# Patient Record
Sex: FEMALE | Race: WHITE | Employment: STUDENT | URBAN - NONMETROPOLITAN AREA
[De-identification: names, ages, dates, MRNs, and addresses within clinical notes are randomized per-mention and may not be internally consistent; named-entity substitution may affect disease eponyms.]

---

## 2017-05-04 ENCOUNTER — HOSPITAL ENCOUNTER (OUTPATIENT)
Dept: OTHER | Age: 22
Discharge: HOME | End: 2017-05-04
Attending: CLINIC/CENTER | Admitting: CLINIC/CENTER

## 2017-05-07 LAB
CULTURE: NORMAL
ORGANISM: NORMAL
REPORT STATUS: NORMAL
REQUEST PROBLEM: NORMAL
SPECIMEN: NORMAL
TOTAL COLONY COUNT: NORMAL

## 2017-11-21 ENCOUNTER — OFFICE VISIT (OUTPATIENT)
Dept: ORTHOPEDIC SURGERY | Age: 22
End: 2017-11-21

## 2017-11-21 DIAGNOSIS — S63.259A FINGER DISLOCATION, INITIAL ENCOUNTER: Primary | ICD-10-CM

## 2017-11-21 DIAGNOSIS — R52 PAIN: ICD-10-CM

## 2017-11-21 PROCEDURE — 73130 X-RAY EXAM OF HAND: CPT | Performed by: PHYSICIAN ASSISTANT

## 2017-11-21 PROCEDURE — 99203 OFFICE O/P NEW LOW 30 MIN: CPT | Performed by: PHYSICIAN ASSISTANT

## 2017-11-21 NOTE — PROGRESS NOTES
Subjective:      Patient ID: Neela Mancilla is a 20 y.o. female.     HPI    Review of Systems    Objective:   Physical Exam    Assessment:      ***      Plan:      ***
stated age. There is no gross instability. There are no rashes, ulcerations or lesions. Strength and tone are normal.      MEDICAL DATA:   Outside record review:  none    Imaging:  Hand x-ray:  3 view(s) of the right hand were obtained and reviewed and show Dislocation: PIP: Little (small) finger. 3 view post-reduction films reveal anatomic reduction of the 5th small finger PIP joint. ASSESSMENT:     1. Finger dislocation, initial encounter     2. Pain  XR HAND LEFT (MIN 3 VIEWS)    CANCELED: XR HAND RIGHT (MIN 3 VIEWS)         PLAN:   · Diagnostic and treatment options discussed. Diagnosis explained. Natural history discussed. Patient's questions answered. Continue marii tape   Continue aluminum splint  May remove for hygiene  Follow-up next Thursday in [facility] until cleared by Higinio Paris, PAEsperanzaC    PROCEDURE:  The patient had a local digital block using 3ml  s of 1% Lidocaine. With adequate anesthesia, I reduced the PIP dislocation of the 5th small finger. Post-reduction films showed anatomic reduction. The patient tolerated the procedure well and was placed in an aluminum splint with marii-taping of the 4th and 5th digits.

## 2017-11-30 ENCOUNTER — OFFICE VISIT (OUTPATIENT)
Dept: ORTHOPEDIC SURGERY | Age: 22
End: 2017-11-30

## 2017-11-30 VITALS — RESPIRATION RATE: 16 BRPM | HEIGHT: 70 IN | BODY MASS INDEX: 23.62 KG/M2 | WEIGHT: 165 LBS

## 2017-11-30 DIAGNOSIS — M79.642 PAIN OF LEFT HAND: ICD-10-CM

## 2017-11-30 DIAGNOSIS — S63.259D FINGER DISLOCATION, SUBSEQUENT ENCOUNTER: Primary | ICD-10-CM

## 2017-11-30 PROCEDURE — 99212 OFFICE O/P EST SF 10 MIN: CPT | Performed by: PHYSICIAN ASSISTANT

## 2017-11-30 NOTE — PROGRESS NOTES
Scribe Authentication Statement  Clarkejose francisco Rodriguesjulio), scribed portions of this documentation for and in the presence of Luciano Stockton PA-C on 11/30/17 at 2:17 PM.      ORTHOPEDIC EVALUATION OF TRAUMA / INJURY    History of Present Illness (HPI):   Patient's PCP is listed as: No primary care provider on file. .    This is a:  []Consult Visit     []New Patient Visit     [x]Established Patient Visit    Shaunna Chi is a 20y.o. year old female evaluated for:    Chief Complaint   Patient presents with    Follow-up     Right finger dislocation       Informant patient   Setting when problem first occurred sports   Mechanism direct impact, patient was playing volleyball and went to block, finger was forced back    Location where pain is most intense Left  Small finger   Quality / Description Only if she hits it on something and still swollen. Severity / Intensity    moderate   Onset date 11/17/17   Timing Stable   Radiates does not radiate   Aggravating factors activity   Relieving factors rest, ice, the use of a splint, avoiding painful activities   Associated manifestations denies erythema or warmth, some tingling    Previous tests and diagnostic procedures none   Previous problems in this area none   Previous treatment Splint, ice, buddy tape   Effect on patient's life none     Review of Systems (ROS):   Problem = 1 , Extended = 2-9 , Complete = 10  Unless otherwise specified in this note, the R.O.S. is reviewed today with the patient and is as below-    ROS    PAST HISTORY:   Unless otherwise specified in this note, the past history is reviewed today with the patient and is as follows-    Allergies   Allergen Reactions    Penicillins Hives       Current Outpatient Prescriptions   Medication Sig Dispense Refill    HYDROcodone-acetaminophen (NORCO) 5-325 MG per tablet Take 1-2 tablets by mouth every 6 hours as needed for Pain .  10 tablet 0    ondansetron (ZOFRAN ODT) 4 MG disintegrating tablet Take 1 tablet make a fist    []Thumb tip can touch pinky tip  [] Deferred due to fracture  Active Wrist Flexion:  []AROM = PROM   Active Wrist Extension:  []AROM = PROM   Active Radial Deviation:  []AROM = PROM   Active Ulnar Deviation:  []AROM = PROM   Passive Wrist Flexion:  []AROM = PROM   Passive Wrist Extension:  []AROM = PROM   Passive Radial Deviation:  []AROM = PROM   Passive Ulnar Deviation:  []AROM = PROM   Finger AROM:  []AROM = PROM   Finger PROM:  []AROM = PROM     Motor Function:   [x] No gross motor weakness of wrist  [] No gross motor weakness of hand  [x]Thumbs Up    [x]Pointer finger    [x]OK sign    []Cross fingers    []Number 5  [x] Motor weakness: able to flex and extend PIP with pain    Neurologic:  [x] Sensation to light touch intact. [] Impaired:  [] Decreased sensation to light touch over median nerve distribution. [] Decreased sensation to light touch over ulnar nerve distribution. [x] Deep tendon reflexes intact. [] Impaired:  [x] Coordination / proprioception intact. [] Impaired:     Palpation: (Not tested if not marked)     Normal Tenderness Swelling Crepitation Calor   Thumb: [x] [] [] [] []   Index: [x] [] [] [] []   Middle: [x] [] [] [] []   Ring: [x] [] [] [] []   Small: [] [x] [x] [] []   A-1 Pulley: [] [] [] [] []   MCP Joint: [] [] [] [] []   Metacarpal: [] [] [] [] []   Palmar Hand: [] [] [] [] []   Snuff Box: [] [] [] [] []   Other: [] [] [] [] []     Comment:     Provocative Tests: (Not tested if not marked)   Negative Positive Positive Findings   Finger instability: [] [x]    Thumb instability: [] []    Triggering: [] []    Thumb CMC Grind Test: [] []    Finklestein's test: [] []    Froment's sign: [] []    Other: [] []      Contralateral Examination:  Examination of the contralateral side is performed for comparison. Unless otherwise specified above, examination of the area does not show any tenderness, deformity or injury.   Range of motion is within full active and passive physiologic range for the patient's stated age. There is no gross instability. There are no rashes, ulcerations or lesions. Strength and tone are normal.      MEDICAL DATA:   Outside record review:  none    Imaging:  Hand x-ray:  3 view(s) of the right hand were obtained and reviewed and show Dislocation: PIP: Little (small) finger. 3 view post-reduction films reveal anatomic reduction of the 5th small finger PIP joint. IMAGES 11-30-17:  3 views of the right hand were obtained and reviewed. X-rays reveal stable reduction of the PIP joint of the small finger. ASSESSMENT:     1. Finger dislocation, subsequent encounter     2. Pain of left hand  XR HAND LEFT (MIN 3 VIEWS)         PLAN:   · Diagnostic and treatment options discussed. Diagnosis explained. Natural history discussed. Patient's questions answered. · Gene tape pinky and ring finger together and aluminum splint applied. · No sports until released. · Follow-up in 2 weeks with new films.       Virgia Dakin, PA-C

## 2018-02-06 ENCOUNTER — HOSPITAL ENCOUNTER (OUTPATIENT)
Dept: OTHER | Age: 23
Discharge: HOME | End: 2018-02-06
Attending: CLINIC/CENTER | Admitting: CLINIC/CENTER

## 2018-02-09 LAB
CULTURE: NORMAL
CULTURE: NORMAL
ORGANISM: NORMAL
ORGANISM: NORMAL
REPORT STATUS: NORMAL
REQUEST PROBLEM: NORMAL
SPECIMEN: NORMAL
TOTAL COLONY COUNT: NORMAL